# Patient Record
Sex: FEMALE | Race: WHITE | Employment: OTHER | URBAN - METROPOLITAN AREA
[De-identification: names, ages, dates, MRNs, and addresses within clinical notes are randomized per-mention and may not be internally consistent; named-entity substitution may affect disease eponyms.]

---

## 2019-09-18 ENCOUNTER — HOSPITAL ENCOUNTER (OUTPATIENT)
Dept: ULTRASOUND IMAGING | Age: 69
Discharge: HOME | End: 2019-09-20
Payer: MEDICARE

## 2019-09-18 ENCOUNTER — HOSPITAL ENCOUNTER (OUTPATIENT)
Age: 69
Discharge: HOME | End: 2019-09-18
Payer: MEDICARE

## 2019-09-18 DIAGNOSIS — N30.20 CHRONIC CYSTITIS: ICD-10-CM

## 2019-09-18 PROCEDURE — 87186 SC STD MICRODIL/AGAR DIL: CPT

## 2019-09-18 PROCEDURE — 87088 URINE BACTERIA CULTURE: CPT

## 2019-09-18 PROCEDURE — 87086 URINE CULTURE/COLONY COUNT: CPT

## 2019-09-18 PROCEDURE — 76770 US EXAM ABDO BACK WALL COMP: CPT

## 2019-09-19 LAB
CULTURE: ABNORMAL
Lab: ABNORMAL
SPECIMEN DESCRIPTION: ABNORMAL

## 2021-07-24 ENCOUNTER — HOSPITAL ENCOUNTER (EMERGENCY)
Age: 71
Discharge: HOME | End: 2021-07-24
Attending: EMERGENCY MEDICINE
Payer: MEDICARE

## 2021-07-24 VITALS
OXYGEN SATURATION: 99 % | WEIGHT: 209 LBS | HEART RATE: 94 BPM | HEIGHT: 64 IN | BODY MASS INDEX: 35.68 KG/M2 | DIASTOLIC BLOOD PRESSURE: 75 MMHG | RESPIRATION RATE: 16 BRPM | TEMPERATURE: 98.1 F | SYSTOLIC BLOOD PRESSURE: 123 MMHG

## 2021-07-24 DIAGNOSIS — T78.40XA ALLERGIC REACTION TO DRUG, INITIAL ENCOUNTER: Primary | ICD-10-CM

## 2021-07-24 PROCEDURE — 99283 EMERGENCY DEPT VISIT LOW MDM: CPT

## 2021-07-24 PROCEDURE — 6370000000 HC RX 637 (ALT 250 FOR IP): Performed by: EMERGENCY MEDICINE

## 2021-07-24 PROCEDURE — 6360000002 HC RX W HCPCS: Performed by: EMERGENCY MEDICINE

## 2021-07-24 RX ORDER — DIAZEPAM 5 MG/1
TABLET ORAL
COMMUNITY

## 2021-07-24 RX ORDER — DEXAMETHASONE SODIUM PHOSPHATE 10 MG/ML
10 INJECTION INTRAMUSCULAR; INTRAVENOUS ONCE
Status: COMPLETED | OUTPATIENT
Start: 2021-07-24 | End: 2021-07-24

## 2021-07-24 RX ORDER — AMLODIPINE BESYLATE 5 MG/1
TABLET ORAL
COMMUNITY

## 2021-07-24 RX ORDER — FAMOTIDINE 20 MG/1
20 TABLET, FILM COATED ORAL ONCE
Status: COMPLETED | OUTPATIENT
Start: 2021-07-24 | End: 2021-07-24

## 2021-07-24 RX ORDER — DIPHENHYDRAMINE HCL 12.5MG/5ML
12.5 LIQUID (ML) ORAL ONCE
Status: COMPLETED | OUTPATIENT
Start: 2021-07-24 | End: 2021-07-24

## 2021-07-24 RX ORDER — DICYCLOMINE HYDROCHLORIDE 10 MG/1
CAPSULE ORAL
COMMUNITY

## 2021-07-24 RX ADMIN — FAMOTIDINE 20 MG: 20 TABLET, FILM COATED ORAL at 12:35

## 2021-07-24 RX ADMIN — DIPHENHYDRAMINE HYDROCHLORIDE 12.5 MG: 25 SOLUTION ORAL at 12:35

## 2021-07-24 RX ADMIN — DEXAMETHASONE SODIUM PHOSPHATE 10 MG: 10 INJECTION INTRAMUSCULAR; INTRAVENOUS at 12:35

## 2021-07-24 RX ADMIN — APIXABAN 2.5 MG: 5 TABLET, FILM COATED ORAL at 13:56

## 2021-07-24 ASSESSMENT — PAIN SCALES - GENERAL: PAINLEVEL_OUTOF10: 9

## 2021-07-24 NOTE — ED PROVIDER NOTES
[id number] [facility] ED  [address] [facility] [address]  Phone: [phone]  Fax: [phone]      Pt Name: Thao Saenz  MRN: [id number]  [place] 1950  Date of evaluation: 7/24/2021  Provider: Nghia Goins, [address] [place] [address]       Chief Complaint   Patient presents with    Allergic Reaction         HISTORY OF PRESENT ILLNESS   (Location/Symptom, Timing/Onset,Context/Setting, Quality, Duration, Modifying Factors, Severity)  Note limiting factors. Thao Saenz is a 65 y.o. female who presents to the emergency department for the evaluation of allergic reaction. Patient states she is noticing hives on her arms, the back of her neck, her upper legs and her stomach. Most of them did resolve at this point but she is still having a rash on the base of her neck and her left arm. She believes this is secondary to aspirin. She was discharged from the hospital on aspirin after her recent hip arthroplasty within the last 2 weeks. This was done in [facility] [employer]. Patient states that in the past she had been on either Lovenox or Eliquis and she has never taken long-term aspirin. She denies any fever. No difficulty breathing or swallowing. She has no other acute complaints. She did take some Benadryl last night with minimal relief. Last dose of aspirin was yesterday at 11 AM    Nursing Notes were reviewed. REVIEW OF SYSTEMS    (2-9systems for level 4, 10 or more for level 5)     Review of Systems   Constitutional: Negative for fever. Musculoskeletal:        Bilateral hip pain   Skin: Positive for rash. Except asnoted above the remainder of the review of systems was reviewed and negative. PAST MEDICAL HISTORY   No past medical history on file. SURGICAL HISTORY     No past surgical history on file.       CURRENT MEDICATIONS     Previous Medications    AMLODIPINE (NORVASC) 5 MG TABLET    amlodipine 5 mg tablet   take 1 tablet by mouth once daily    DIAZEPAM (VALIUM) 5 MG TABLET    diazepam 5 mg tablet   take 1 tablet by mouth every 6 hours if needed    DICYCLOMINE (BENTYL) 10 MG CAPSULE    dicyclomine 10 mg capsule   TAKE ONE CAPSULE BY MOUTH THREE TIMES DAILY       ALLERGIES     Latex, Nitrofurantoin macrocrystal, Codeine, Erythromycin base, Morphine, Pregabalin, and Sulfa antibiotics    FAMILY HISTORY     No family history on file. SOCIAL HISTORY       Social History     Socioeconomic History    Marital status:      Spouse name: Not on file    Number of children: Not on file    Years of education: Not on file    Highest education level: Not on file   Occupational History    Not on file   Tobacco Use    Smoking status: Not on file   Substance and Sexual Activity    Alcohol use: Not on file    Drug use: Not on file    Sexual activity: Not on file   Other Topics Concern    Not on file   Social History Narrative    Not on file     Social Determinants of Health     Financial Resource Strain:     Difficulty of Paying Living Expenses:    Food Insecurity:     Worried About Running Out of Food in the Last Year:     [address] in the Last Year:    Transportation Needs:     Lack of Transportation (Medical):      Lack of Transportation (Non-Medical):    Physical Activity:     Days of Exercise per Week:     Minutes of Exercise per Session:    Stress:     Feeling of Stress :    Social Connections:     Frequency of Communication with Friends and Family:     Frequency of Social Gatherings with Friends and Family:     Attends Worship Services:     Active Member of Clubs or Organizations:     Attends Club or Organization Meetings:     Marital Status:    Intimate Partner Violence:     Fear of Current or Ex-Partner:     Emotionally Abused:     Physically Abused:     Sexually Abused:        SCREENINGS             PHYSICAL EXAM    (up to 7 for level 4, 8 or more for level 5)     ED Triage Vitals   BP Temp Temp Source Pulse Resp SpO2 Height Weight   07/24/21 1220 07/24/21 1220 07/24/21 1220 07/24/21 1220 07/24/21 1220 07/24/21 1220 07/24/21 1218 07/24/21 1218   123/75 98.1 °F (36.7 °C) Oral 94 16 99 % 5' 4\" (1.626 m) 209 lb (94.8 kg)       Physical Exam  Vitals and nursing note reviewed. Constitutional:       General: She is not in acute distress. Appearance: Normal appearance. She is not ill-appearing or toxic-appearing. HENT:      Head: Normocephalic and atraumatic. Nose: Nose normal. No congestion. Mouth/Throat:      Mouth: Mucous membranes are moist.   Eyes:      General:         Right eye: No discharge. Left eye: No discharge. Conjunctiva/sclera: Conjunctivae normal.   Cardiovascular:      Rate and Rhythm: Normal rate and regular rhythm. Pulses: Normal pulses. Heart sounds: Normal heart sounds. No murmur heard. Pulmonary:      Effort: Pulmonary effort is normal. No respiratory distress. Breath sounds: Normal breath sounds. No wheezing. Abdominal:      General: Abdomen is flat. There is no distension. Palpations: Abdomen is soft. Tenderness: There is no abdominal tenderness. Musculoskeletal:         General: No deformity or signs of injury. Normal range of motion. Cervical back: Normal range of motion. Skin:     General: Skin is warm and dry. Capillary Refill: Capillary refill takes less than 2 seconds. Findings: No rash. Comments: Hives noted, left wrist, base of the neck   Neurological:      General: No focal deficit present. Mental Status: She is alert. Mental status is at baseline. Motor: No weakness. Comments: Speaking normally. No facial asymmetry. Moving all 4 extremities. Normal gait.     Psychiatric:         Mood and Affect: Mood normal.         EMERGENCY DEPARTMENT COURSE and DIFFERENTIAL DIAGNOSIS/MDM:   Vitals:    Vitals:    07/24/21 1218 07/24/21 1220   BP:  123/75   Pulse:  94 Resp:  16   Temp:  98.1 °F (36.7 °C)   TempSrc:  Oral   SpO2:  99%   Weight: 94.8 kg (209 lb)    Height: 5' 4\" (1.626 m)        Patient presents to the emergency department with a complaint described above. Vital signs are grossly normal and she is nontoxic. She does appear to have evidence of allergic reaction, at this time, I do think it would be prudent to stop aspirin. I am contacting her orthopedic surgeon to try to get some recommendations on what to change her over to      [address]:  Labs Reviewed - No data to display    All other labs were within normal range or not returned as of this dictation. RADIOLOGY:  No orders to display         ED Course as of Jul 24 1350   Sat Jul 24, 2021   1333 I did speak with Kaden, the physician assistant on-call for Dr. Kiko adams. He states that if patient cannot tolerate aspirin he recommends being on Eliquis 2.5 mg twice a day, this should run 6 weeks from the date of the surgery. I will put her on it for a month since she is 2 weeks out from surgery. I have advised her to call the office on Monday to set up an appointment for reevaluation and further treatment. I have asked her to monitor the rash, it should improve at this point, she should stop taking aspirin. Clearly if it is getting worse or she develops any new or concerning symptoms in general she should return to the ED    At this time the patient is without objective evidence of an acute process requiring hospitalization or inpatient management. They have remained hemodynamically stable and are stable for discharge with outpatient follow-up. Standard anticipatory guidance given to patient upon discharge. Have given them a specific time frame in which to follow-up and who to follow-up with. I have also advised them that they should return to the emergency department if they get worse, or not getting better or develop any new or concerning symptoms.   Patient demonstrates understanding.    [TS]      ED Course User Index  [TS] Roxi Garcia DO         PROCEDURES:  Unless otherwise noted below, none     Procedures    FINAL IMPRESSION      1.  Allergic reaction to drug, initial encounter          DISPOSITION/PLAN   DISPOSITION Decision To Discharge 07/24/2021 01:48:12 PM      PATIENT REFERRED TO:  Your orthopedic surgeon  Call their office Monday morning to discuss medication change and follow-up          DISCHARGE MEDICATIONS:  New Prescriptions    APIXABAN (ELIQUIS) 2.5 MG TABS TABLET    Take 1 tablet by mouth 2 times daily          (Please note that portions of this note were completed with a voice recognition program.  Efforts were made to edit the dictations but occasionally words are mis-transcribed.)    Roxi Garcia DO (electronically signed)  Board Certified Emergency Physician          Roxi Garcia DO  07/24/21 0283

## 2022-02-07 ENCOUNTER — APPOINTMENT (OUTPATIENT)
Dept: GENERAL RADIOLOGY | Age: 72
End: 2022-02-07
Payer: MEDICARE

## 2022-02-07 ENCOUNTER — HOSPITAL ENCOUNTER (EMERGENCY)
Age: 72
Discharge: HOME | End: 2022-02-07
Attending: EMERGENCY MEDICINE
Payer: MEDICARE

## 2022-02-07 VITALS
DIASTOLIC BLOOD PRESSURE: 92 MMHG | SYSTOLIC BLOOD PRESSURE: 154 MMHG | BODY MASS INDEX: 33.29 KG/M2 | RESPIRATION RATE: 18 BRPM | HEIGHT: 64 IN | OXYGEN SATURATION: 99 % | HEART RATE: 90 BPM | WEIGHT: 195 LBS | TEMPERATURE: 97.6 F

## 2022-02-07 DIAGNOSIS — M19.071 DEGENERATIVE ARTHRITIS OF TOE JOINT, RIGHT: Primary | ICD-10-CM

## 2022-02-07 PROCEDURE — 99284 EMERGENCY DEPT VISIT MOD MDM: CPT

## 2022-02-07 PROCEDURE — 6370000000 HC RX 637 (ALT 250 FOR IP): Performed by: EMERGENCY MEDICINE

## 2022-02-07 PROCEDURE — 73630 X-RAY EXAM OF FOOT: CPT

## 2022-02-07 RX ORDER — ACETAMINOPHEN 500 MG
500 TABLET ORAL EVERY 6 HOURS PRN
COMMUNITY

## 2022-02-07 RX ORDER — PROMETHAZINE HYDROCHLORIDE 12.5 MG/1
TABLET ORAL
COMMUNITY

## 2022-02-07 RX ORDER — SIMETHICONE 80 MG
80 TABLET,CHEWABLE ORAL 2 TIMES DAILY
COMMUNITY

## 2022-02-07 RX ORDER — ONDANSETRON 4 MG/1
TABLET, FILM COATED ORAL
COMMUNITY

## 2022-02-07 RX ORDER — DOCUSATE SODIUM 100 MG/1
CAPSULE, LIQUID FILLED ORAL
COMMUNITY

## 2022-02-07 RX ORDER — ACETAMINOPHEN 500 MG
1000 TABLET ORAL ONCE
Status: COMPLETED | OUTPATIENT
Start: 2022-02-07 | End: 2022-02-07

## 2022-02-07 RX ADMIN — ACETAMINOPHEN 1000 MG: 500 TABLET ORAL at 19:29

## 2022-02-07 ASSESSMENT — PAIN DESCRIPTION - LOCATION: LOCATION: FOOT

## 2022-02-07 ASSESSMENT — PAIN DESCRIPTION - PAIN TYPE: TYPE: ACUTE PAIN

## 2022-02-07 ASSESSMENT — PAIN - FUNCTIONAL ASSESSMENT: PAIN_FUNCTIONAL_ASSESSMENT: PREVENTS OR INTERFERES SOME ACTIVE ACTIVITIES AND ADLS

## 2022-02-07 ASSESSMENT — PAIN SCALES - GENERAL
PAINLEVEL_OUTOF10: 6
PAINLEVEL_OUTOF10: 6

## 2022-02-07 ASSESSMENT — PAIN DESCRIPTION - ORIENTATION: ORIENTATION: RIGHT

## 2022-02-07 ASSESSMENT — PAIN DESCRIPTION - DESCRIPTORS: DESCRIPTORS: DISCOMFORT

## 2022-02-08 ENCOUNTER — OFFICE VISIT (OUTPATIENT)
Dept: ORTHOPEDIC SURGERY | Age: 72
End: 2022-02-08
Payer: MEDICARE

## 2022-02-08 VITALS — HEIGHT: 64 IN | RESPIRATION RATE: 12 BRPM | BODY MASS INDEX: 33.29 KG/M2 | WEIGHT: 195 LBS

## 2022-02-08 DIAGNOSIS — M79.674 PAIN OF RIGHT GREAT TOE: Primary | ICD-10-CM

## 2022-02-08 DIAGNOSIS — M20.11 HALLUX VALGUS (ACQUIRED), RIGHT FOOT: ICD-10-CM

## 2022-02-08 DIAGNOSIS — Z76.89 ENCOUNTER TO ESTABLISH CARE: ICD-10-CM

## 2022-02-08 PROCEDURE — 99204 OFFICE O/P NEW MOD 45 MIN: CPT | Performed by: PHYSICIAN ASSISTANT

## 2022-02-08 PROCEDURE — 4040F PNEUMOC VAC/ADMIN/RCVD: CPT | Performed by: PHYSICIAN ASSISTANT

## 2022-02-08 PROCEDURE — G8484 FLU IMMUNIZE NO ADMIN: HCPCS | Performed by: PHYSICIAN ASSISTANT

## 2022-02-08 PROCEDURE — G8417 CALC BMI ABV UP PARAM F/U: HCPCS | Performed by: PHYSICIAN ASSISTANT

## 2022-02-08 PROCEDURE — 1090F PRES/ABSN URINE INCON ASSESS: CPT | Performed by: PHYSICIAN ASSISTANT

## 2022-02-08 PROCEDURE — G8400 PT W/DXA NO RESULTS DOC: HCPCS | Performed by: PHYSICIAN ASSISTANT

## 2022-02-08 PROCEDURE — 1123F ACP DISCUSS/DSCN MKR DOCD: CPT | Performed by: PHYSICIAN ASSISTANT

## 2022-02-08 PROCEDURE — G8427 DOCREV CUR MEDS BY ELIG CLIN: HCPCS | Performed by: PHYSICIAN ASSISTANT

## 2022-02-08 PROCEDURE — 3017F COLORECTAL CA SCREEN DOC REV: CPT | Performed by: PHYSICIAN ASSISTANT

## 2022-02-08 PROCEDURE — 1036F TOBACCO NON-USER: CPT | Performed by: PHYSICIAN ASSISTANT

## 2022-02-08 RX ORDER — METHYLPREDNISOLONE 4 MG/1
TABLET ORAL
Qty: 1 KIT | Refills: 0 | Status: SHIPPED | OUTPATIENT
Start: 2022-02-08 | End: 2022-02-14

## 2022-02-08 ASSESSMENT — ENCOUNTER SYMPTOMS
VOMITING: 0
SHORTNESS OF BREATH: 0
COUGH: 0
COLOR CHANGE: 0

## 2022-02-08 NOTE — ED NOTES
Patient states she had gotten up from bed and when placing her right foot down, she had heard a \"pop\" with severe pain associated with the sound. Denies any additional complaints. Limited ability to bear weight on affected foot, has full PMS with no pain at any additional location. Pain is mainly in first toe on right foot.        Xavier Das, NATALIE  02/07/22 6051

## 2022-02-08 NOTE — ED PROVIDER NOTES
[facility] ED  EMERGENCY DEPARTMENT ENCOUNTER      Pt Name: Nayeli Maza  MRN: [id number]  [place] 1950  Date of evaluation: 2/7/2022  Provider: Shayan Oh MD    CHIEF COMPLAINT       Chief Complaint   Patient presents with    Foot Injury       HISTORY OF PRESENT ILLNESS  (Location/Symptom, Timing/Onset, Context/Setting, Quality, Duration, Modifying Factors, Severity.)   Nayeli Maza is a 66 y.o. female who presents to the emergency department complaining of right great toe pain. She relates she got out of bed on Saturday and felt a snapping pop and its been hurting ever since. She relates it is now black and blue and quite painful. Walking is very uncomfortable. She cannot think of any other trauma and has had no trouble with this foot previously. She is not diabetic. No fevers. No nausea or vomiting. Nursing Notes were reviewed. REVIEW OF SYSTEMS    (2-9 systems for level 4, 10 or more for level 5)     Review of Systems   Musculoskeletal:        L great toe pain and change in color   All other systems reviewed and are negative. Except as noted above the remainder of the review of systems was reviewed and negative.        PAST MEDICAL HISTORY     Past Medical History:   Diagnosis Date    Anxiety     Cancer ([address]     Hypertension     IBS (irritable bowel syndrome)        SURGICAL HISTORY       Past Surgical History:   Procedure Laterality Date    APPENDECTOMY      BACK SURGERY      FRACTURE SURGERY      HYSTERECTOMY      JOINT REPLACEMENT      TONSILLECTOMY         CURRENT MEDICATIONS       Discharge Medication List as of 2/7/2022  7:50 PM      CONTINUE these medications which have NOT CHANGED    Details   docusate sodium (COLACE) 100 MG capsule Historical Med      acetaminophen (TYLENOL) 500 MG tablet Take 500 mg by mouth every 6 hours as neededHistorical Med      ondansetron (ZOFRAN) 4 MG tablet ondansetron HCl 4 mg tablet   take 1 tablet by mouth once dailyHistorical Med      promethazine (PHENERGAN) 12.5 MG tablet promethazine 12.5 mg tablet   Take 1 tablet 4 times a day by oral route. Historical Med      simethicone (MYLICON) 80 MG chewable tablet Take 80 mg by mouth 2 times dailyHistorical Med      diazePAM (VALIUM) 5 MG tablet diazepam 5 mg tablet   take 1 tablet by mouth every 6 hours if neededHistorical Med      amLODIPine (NORVASC) 5 MG tablet amlodipine 5 mg tablet   take 1 tablet by mouth once dailyHistorical Med      dicyclomine (BENTYL) 10 MG capsule dicyclomine 10 mg capsule   TAKE ONE CAPSULE BY MOUTH THREE TIMES DAILYHistorical Med      apixaban (ELIQUIS) 2.5 MG TABS tablet Take 1 tablet by mouth 2 times daily, Disp-60 tablet, R-0Normal             ALLERGIES     Latex, Nitrofurantoin macrocrystal, Adhesive tape, Aspirin, Celecoxib, Codeine, Erythromycin, Erythromycin base, Morphine, Penicillins, Pregabalin, and Sulfa antibiotics    FAMILY HISTORY           Family history unknown: Yes     No family status information on file. SOCIAL HISTORY      reports that she has never smoked. She has never used smokeless tobacco. She reports current alcohol use. She reports that she does not use drugs. PHYSICAL EXAM    (up to 7 for level 4, 8 or more for level 5)     ED Triage Vitals [02/07/22 1851]   BP Temp Temp Source Pulse Resp SpO2 Height Weight   (!) 154/92 97.6 °F (36.4 °C) Skin 90 18 99 % 5' 4\" (1.626 m) 195 lb (88.5 kg)     Physical Exam  Vitals and nursing note reviewed. Constitutional:       General: She is not in acute distress. Appearance: She is well-developed. She is not ill-appearing, toxic-appearing or diaphoretic. HENT:      Head: Normocephalic and atraumatic. Right Ear: External ear normal.      Left Ear: External ear normal.      Nose: Nose normal.      Mouth/Throat:      Mouth: Mucous membranes are moist.   Eyes:      General:         Right eye: No discharge. Left eye: No discharge.       Conjunctiva/sclera: Conjunctivae normal.      Pupils: Pupils are equal, round, and reactive to light. Cardiovascular:      Rate and Rhythm: Normal rate and regular rhythm. Heart sounds: Normal heart sounds. No murmur heard. Pulmonary:      Effort: Pulmonary effort is normal. No respiratory distress. Breath sounds: Normal breath sounds. No wheezing, rhonchi or rales. Chest:      Chest wall: No tenderness. Abdominal:      General: Bowel sounds are normal. There is no distension. Palpations: Abdomen is soft. There is no mass. Tenderness: There is no abdominal tenderness. There is no guarding or rebound. Musculoskeletal:         General: Swelling and tenderness present. No deformity or signs of injury. Normal range of motion. Cervical back: Normal range of motion and neck supple. Right lower leg: No edema. Left lower leg: No edema. Skin:     General: Skin is warm. Findings: Bruising present. No rash. Neurological:      General: No focal deficit present. Mental Status: She is alert and oriented to person, place, and time. Motor: No abnormal muscle tone. Psychiatric:         Mood and Affect: Mood normal.         Behavior: Behavior normal.         DIAGNOSTIC RESULTS     EKG: All EKG's are interpreted by the Emergency Department Physician who either signs or Co-signs this chart in the absence of a cardiologist.    none    RADIOLOGY:   Non-plain film images such as CT, Ultrasound and MRI are read by the radiologist.     Interpretation per the Radiologist below, if available at the time of this note:    XR FOOT RIGHT (MIN 3 VIEWS)   Final Result   Soft tissue swelling along the dorsal forefoot without acute bony abnormality. Mild degenerative changes at the 1st metatarsophalangeal joint and tibiotalar   joint with moderate tarsometatarsal joint degenerative change. Plantar calcaneal spur.                ED BEDSIDE ULTRASOUND:   Performed by ED Physician - none    LABS:  Labs Reviewed - No data to display    All other labs were within normal range or not returned as of this dictation. EMERGENCY DEPARTMENT COURSE and DIFFERENTIAL DIAGNOSIS/MDM:   Vitals:    Vitals:    02/07/22 1851   BP: (!) 154/92   Pulse: 90   Resp: 18   Temp: 97.6 °F (36.4 °C)   TempSrc: Skin   SpO2: 99%   Weight: 88.5 kg (195 lb)   Height: 5' 4\" (1.626 m)     We did discuss getting some imaging and she is agreeable. She does not want anything currently for pain. CONSULTS:  None    PROCEDURES:  None    FINAL IMPRESSION      1.  Degenerative arthritis of toe joint, right          DISPOSITION/PLAN   DISPOSITION Decision To Discharge 02/07/2022 07:43:34 PM      PATIENT REFERRED TO:  [address] [id number] [phone]    Call in 1 day        DISCHARGE MEDICATIONS:  Discharge Medication List as of 2/7/2022  7:50 PM          (Please note that portions of this note were completed with a voice recognition program.  Efforts were made to edit the dictations but occasionally words are mis-transcribed.)    Annamaria Jordan MD  Attending Emergency Physician        Annamaria Jordan MD  02/08/22 3835

## 2022-02-08 NOTE — PROGRESS NOTES
[address] [facility] ORTHOPEDICS AND SPORTS MEDICINE  [address] [facility]  [address] [facility] [address]  Dept: [phone]    Ambulatory Orthopedic New Patient Visit      CHIEF COMPLAINT:    Chief Complaint   Patient presents with    Toe Injury     right great toe DOI- 2/5/22       HISTORY OF PRESENT ILLNESS:      Onset of Pain: 2/5/2022    The patient is a 66 y.o. female who is being seen  for consultation and evaluation of right great toe pain. Nafisa Barrientos  presents today for evaluation of right foot/great toe pain present for 3 days. The patient has not sustained a previous trauma to the affected foot. Patient notes that on Saturday, 2/5/2021 she woke up and felt a pop sensation within the right great toe. She denies injury or trauma to the toe. She notes that she had to make a trip to [address] [place] [address] to see a family member and was unable to seek medical evaluation until yesterday 2/7/2022 when she presented to [facility] emergency department. X-rays of the right foot were obtained and revealed no acute osseous abnormality in her fracture. Patient was placed in a fracture shoe and was instructed to follow-up in our office for further evaluation and treatment. Today, the patient notes that pain is noted with ambulation. She has been wearing the fracture shoe but notes that it does not fit properly and she would like a different size and or a walking boot. The pain improves with rest, elevation and over-the-counter Tylenol. She notes the pain worsens with ambulation. Patient denies history of gout. The patient has not had a previous corticosteroid injection. The patient has not had previous physical therapy for this problem. The patient has tried over-the-counter Tylenol for her discomfort and notes very minimal relief of her symptoms.     Patient recently moved here from [place] and has not established care with a primary care physician yet. Past Medical History:    Past Medical History:   Diagnosis Date    Anxiety     Cancer ([address]     Hypertension     IBS (irritable bowel syndrome)        Past Surgical History:    Past Surgical History:   Procedure Laterality Date    APPENDECTOMY      BACK SURGERY      FRACTURE SURGERY      HYSTERECTOMY      JOINT REPLACEMENT      TONSILLECTOMY         Current Medications:   Current Outpatient Medications   Medication Sig Dispense Refill    methylPREDNISolone (MEDROL DOSEPACK) 4 MG tablet Take by mouth. 1 kit 0    docusate sodium (COLACE) 100 MG capsule       acetaminophen (TYLENOL) 500 MG tablet Take 500 mg by mouth every 6 hours as needed      ondansetron (ZOFRAN) 4 MG tablet ondansetron HCl 4 mg tablet   take 1 tablet by mouth once daily      promethazine (PHENERGAN) 12.5 MG tablet promethazine 12.5 mg tablet   Take 1 tablet 4 times a day by oral route.  simethicone (MYLICON) 80 MG chewable tablet Take 80 mg by mouth 2 times daily      diazePAM (VALIUM) 5 MG tablet diazepam 5 mg tablet   take 1 tablet by mouth every 6 hours if needed      amLODIPine (NORVASC) 5 MG tablet amlodipine 5 mg tablet   take 1 tablet by mouth once daily      dicyclomine (BENTYL) 10 MG capsule dicyclomine 10 mg capsule   TAKE ONE CAPSULE BY MOUTH THREE TIMES DAILY       No current facility-administered medications for this visit. Allergies:    Latex, Nitrofurantoin macrocrystal, Adhesive tape, Aspirin, Celecoxib, Codeine, Erythromycin, Erythromycin base, Morphine, Penicillins, Pregabalin, and Sulfa antibiotics    Social History:   Social History     Socioeconomic History    Marital status:       Spouse name: Not on file    Number of children: Not on file    Years of education: Not on file    Highest education level: Not on file   Occupational History    Not on file   Tobacco Use    Smoking status: Never Smoker    Smokeless tobacco: Never Used   Vaping Use    Vaping Use: Never used   Substance and Sexual Activity    Alcohol use: Yes    Drug use: Never    Sexual activity: Not on file   Other Topics Concern    Not on file   Social History Narrative    Not on file     Social Determinants of Health     Financial Resource Strain:     Difficulty of Paying Living Expenses: Not on file   Food Insecurity:     Worried About Running Out of Food in the Last Year: Not on file    Reyna of Food in the Last Year: Not on file   Transportation Needs:     Lack of Transportation (Medical): Not on file    Lack of Transportation (Non-Medical): Not on file   Physical Activity:     Days of Exercise per Week: Not on file    Minutes of Exercise per Session: Not on file   Stress:     Feeling of Stress : Not on file   Social Connections:     Frequency of Communication with Friends and Family: Not on file    Frequency of Social Gatherings with Friends and Family: Not on file    Attends [employer] Services: Not on file    Active Member of [address] [place] [address] or Organizations: Not on file    Attends Club or Organization Meetings: Not on file    Marital Status: Not on file   Intimate Partner Violence:     Fear of Current or Ex-Partner: Not on file    Emotionally Abused: Not on file    Physically Abused: Not on file    Sexually Abused: Not on file   Housing Stability:     Unable to Pay for Housing in the Last Year: Not on file    Number of [place] in the Last Year: Not on file    Unstable Housing in the Last Year: Not on file       Family History:  Family History   Family history unknown: Yes         REVIEW OF SYSTEMS:  Review of Systems   Constitutional: Negative for activity change and fever. HENT: Negative for sneezing. Respiratory: Negative for cough and shortness of breath. Cardiovascular: Negative for chest pain. Gastrointestinal: Negative for vomiting. Musculoskeletal: Positive for arthralgias (Right great toe) and joint swelling (Right great toe).  Negative for myalgias. Skin: Negative for color change. Neurological: Negative for weakness and numbness. Psychiatric/Behavioral: Negative for sleep disturbance. PHYSICAL EXAM:  Resp 12   Ht 5' 4\" (1.626 m)   Wt 195 lb (88.5 kg)   BMI 33.47 kg/m²  Body mass index is 33.47 kg/m². Physical Exam  Gen: alert and oriented  Psych:  Appropriate affect; Appropriate knowledge base; Appropriate mood; No hallucinations; Head: normocephalic, atraumatic   Chest: symmetric chest excursion  Pelvis: stable with ambulation  Ortho Exam  Extremity: Evaluation of the right foot reveals a obvious hallux valgus deformity. There is mild ecchymosis noted over the right great toe (picture below). There is no open skin lesions or signs of infection noted to the right foot. Patient is able to wiggle all 5 toes without difficulty. Significant tenderness noted over the MTP joint and the IP joint of the right great toe. Sensation is intact to light touch to the right lower extremity without focal deficits present. The skin is noted to be warm with brisk capillary refill distally on the right foot in its entirety. DP pulse 2+ on the right foot. Radiology:  XR FOOT RIGHT (MIN 3 VIEWS)    Result Date: 2/7/2022  EXAMINATION: THREE XRAY VIEWS OF THE RIGHT FOOT 2/7/2022 4:04 pm COMPARISON: None HISTORY: ORDERING SYSTEM PROVIDED HISTORY: injury TECHNOLOGIST PROVIDED HISTORY: injury Reason for Exam: injury FINDINGS: No acute fracture. Nonweightbearing alignment is within normal limits. Mild degenerative changes at the 1st metatarsophalangeal joint and ankle with moderate tarsometatarsal joint degenerative changes. Plantar calcaneal spur. Soft tissue swelling along the dorsal forefoot. Soft tissue swelling along the dorsal forefoot without acute bony abnormality. Mild degenerative changes at the 1st metatarsophalangeal joint and tibiotalar joint with moderate tarsometatarsal joint degenerative change.  Plantar calcaneal spur.        ASSESSMENT:     1. Pain of right great toe    2. Hallux valgus (acquired), right foot    3. Encounter to establish care         PLAN:       Today in office we discussed etiology and natural history of right great toe pain with possible initial gout exacerbation. Very low suspicion for septic arthritis within the right great toe and or cellulitis. I personally reviewed the patient's x-rays from 2/7/2022 revealing mild degenerative changes within the right foot but no acute osseous abnormality in her fracture appreciated. Due to the patient's continued pain within the right great toe that is lasted over the last 3 days, she was placed in a Cam walking boot to help with comfort and support she was also given a prescription for Medrol Dosepak to help with her inflammation and treatment of possible gout. The patient was given a referral to [facility] to establish primary care provider. Patient will follow up in our office in 7 to 10 days for further evaluation of her right great toe. She may continue activity as tolerated on the right lower extremity and was instructed to call our office if the redness, pain or swelling within the right great toe increases. We discussed that the patient should call us with any questions or concerns. The patient voiced her understanding. Return in about 9 days (around 2/17/2022) for re-evaluation. Orders Placed This Encounter   Medications    methylPREDNISolone (MEDROL DOSEPACK) 4 MG tablet     Sig: Take by mouth.      Dispense:  1 kit     Refill:  0       Orders Placed This Encounter   Procedures   [address] [facility], [facility], [facility]     Referral Priority:   Routine     Referral Type:   Eval and Treat     Referral Reason:   Specialty Services Required     Referred to Provider:   RALPH Grady NP     Requested Specialty:   Nurse Practitioner     Number of Visits Requested:   1       This note is created with the assistance of a speech recognition program.  While intending to generate a document that actually reflects the content of the visit, the document can still have some errors including those of syntax and sound a like substitutions which may escape proof reading. In such instances, actual meaning can be extrapolated by contextual diversion.      Electronically signed by Shruthi Liriano PA-C 2/8/2022 at 1:34 PM

## 2022-09-22 ENCOUNTER — HOSPITAL ENCOUNTER (OUTPATIENT)
Dept: VASCULAR LAB | Age: 72
Discharge: HOME | End: 2022-09-22
Payer: MEDICARE

## 2022-09-22 DIAGNOSIS — R42 DIZZINESS AND GIDDINESS: ICD-10-CM

## 2022-09-22 PROCEDURE — 93880 EXTRACRANIAL BILAT STUDY: CPT

## 2024-02-22 ENCOUNTER — HOSPITAL ENCOUNTER (OUTPATIENT)
Dept: MRI IMAGING | Facility: CLINIC | Age: 74
Discharge: HOME | End: 2024-02-24
Payer: MEDICARE

## 2024-02-22 DIAGNOSIS — R42 DIZZINESS: ICD-10-CM

## 2024-02-22 DIAGNOSIS — Z85.820 HISTORY OF MELANOMA: ICD-10-CM

## 2024-02-22 LAB — CREAT BLD-MCNC: 0.7 MG/DL (ref 0.6–1.4)

## 2024-02-22 PROCEDURE — 70553 MRI BRAIN STEM W/O & W/DYE: CPT

## 2024-02-22 PROCEDURE — 82565 ASSAY OF CREATININE: CPT

## 2024-02-22 PROCEDURE — A9579 GAD-BASE MR CONTRAST NOS,1ML: HCPCS | Performed by: PSYCHIATRY & NEUROLOGY

## 2024-02-22 PROCEDURE — 6360000004 HC RX CONTRAST MEDICATION: Performed by: PSYCHIATRY & NEUROLOGY

## 2024-02-22 RX ADMIN — GADOTERIDOL 20 ML: 279.3 INJECTION, SOLUTION INTRAVENOUS at 15:24

## 2025-03-28 ENCOUNTER — HOSPITAL ENCOUNTER (OUTPATIENT)
Dept: GENERAL RADIOLOGY | Age: 75
Discharge: HOME | End: 2025-03-30
Payer: MEDICARE

## 2025-03-28 ENCOUNTER — HOSPITAL ENCOUNTER (OUTPATIENT)
Age: 75
Discharge: HOME | End: 2025-03-30
Payer: MEDICARE

## 2025-03-28 DIAGNOSIS — R50.9 ACUTE FEBRILE ILLNESS: ICD-10-CM

## 2025-03-28 DIAGNOSIS — J20.9 ACUTE BRONCHITIS, UNSPECIFIED ORGANISM: ICD-10-CM

## 2025-03-28 PROCEDURE — 71046 X-RAY EXAM CHEST 2 VIEWS: CPT

## 2025-06-30 ENCOUNTER — TRANSCRIBE ORDERS (OUTPATIENT)
Dept: ADMINISTRATIVE | Age: 75
End: 2025-06-30

## 2025-06-30 DIAGNOSIS — M51.360 DISCOGENIC SYNDROME, LUMBAR: Primary | ICD-10-CM

## 2025-07-23 ENCOUNTER — HOSPITAL ENCOUNTER (OUTPATIENT)
Dept: MRI IMAGING | Age: 75
Discharge: HOME | End: 2025-07-25
Payer: MEDICARE

## 2025-07-23 ENCOUNTER — HOSPITAL ENCOUNTER (OUTPATIENT)
Dept: CT IMAGING | Age: 75
Discharge: HOME | End: 2025-07-25
Payer: MEDICARE

## 2025-07-23 DIAGNOSIS — M51.360 DISCOGENIC SYNDROME, LUMBAR: ICD-10-CM

## 2025-07-23 PROCEDURE — 72131 CT LUMBAR SPINE W/O DYE: CPT

## 2025-07-23 PROCEDURE — 72148 MRI LUMBAR SPINE W/O DYE: CPT
